# Patient Record
Sex: MALE | Race: BLACK OR AFRICAN AMERICAN | NOT HISPANIC OR LATINO | Employment: UNEMPLOYED | URBAN - METROPOLITAN AREA
[De-identification: names, ages, dates, MRNs, and addresses within clinical notes are randomized per-mention and may not be internally consistent; named-entity substitution may affect disease eponyms.]

---

## 2019-09-07 ENCOUNTER — HOSPITAL ENCOUNTER (EMERGENCY)
Facility: CLINIC | Age: 34
Discharge: HOME | End: 2019-09-07
Attending: EMERGENCY MEDICINE | Admitting: EMERGENCY MEDICINE
Payer: MEDICARE

## 2019-09-07 VITALS
OXYGEN SATURATION: 99 % | HEIGHT: 60 IN | TEMPERATURE: 96.9 F | WEIGHT: 225.8 LBS | DIASTOLIC BLOOD PRESSURE: 71 MMHG | SYSTOLIC BLOOD PRESSURE: 120 MMHG | HEART RATE: 66 BPM | RESPIRATION RATE: 16 BRPM | BODY MASS INDEX: 44.33 KG/M2

## 2019-09-07 DIAGNOSIS — B34.9 VIRAL ILLNESS: ICD-10-CM

## 2019-09-07 LAB
DEPRECATED S PYO AG THROAT QL EIA: NORMAL
SPECIMEN SOURCE: NORMAL

## 2019-09-07 PROCEDURE — 87880 STREP A ASSAY W/OPTIC: CPT | Performed by: FAMILY MEDICINE

## 2019-09-07 PROCEDURE — 87081 CULTURE SCREEN ONLY: CPT | Performed by: FAMILY MEDICINE

## 2019-09-07 PROCEDURE — 99283 EMERGENCY DEPT VISIT LOW MDM: CPT | Performed by: EMERGENCY MEDICINE

## 2019-09-07 PROCEDURE — 99283 EMERGENCY DEPT VISIT LOW MDM: CPT | Mod: Z6 | Performed by: EMERGENCY MEDICINE

## 2019-09-07 RX ORDER — ACETAMINOPHEN 325 MG/1
325-650 TABLET ORAL
COMMUNITY
Start: 2017-02-18

## 2019-09-07 RX ORDER — IBUPROFEN 800 MG/1
800 TABLET, FILM COATED ORAL
COMMUNITY
Start: 2011-08-07

## 2019-09-07 ASSESSMENT — ENCOUNTER SYMPTOMS
RHINORRHEA: 0
SORE THROAT: 1
COUGH: 1
FEVER: 0
MYALGIAS: 1
WEAKNESS: 1
FATIGUE: 1

## 2019-09-07 ASSESSMENT — MIFFLIN-ST. JEOR: SCORE: 896.72

## 2019-09-07 NOTE — ED AVS SNAPSHOT
[facility], [place], Emergency Department  [address]  [place] [address]  Phone:  [phone]  Fax:  [phone]                                    Bassem Thornton   MRN: [id number]    Department:  [facility], Emergency Department   Date of Visit:  9/7/2019           After Visit Summary Signature Page    I have received my discharge instructions, and my questions have been answered. I have discussed any challenges I see with this plan with the nurse or doctor.    ..........................................................................................................................................  Patient/Patient Representative Signature      ..........................................................................................................................................  Patient Representative Print Name and Relationship to Patient    ..................................................               ................................................  Date                                   Time    ..........................................................................................................................................  Reviewed by Signature/Title    ...................................................              ..............................................  Date                                               Time          22EPIC Rev 08/18

## 2019-09-07 NOTE — ED PROVIDER NOTES
"  History     Chief Complaint   Patient presents with     Pharyngitis     onset yesterday morning; mild redness but no white spots in throat; denies dysphagia, just hurts to swallow     Cough     non-productive; onset last evening at 2200; denies fever or chills;      HPI  Bassem Thornton is a 34 year old male who presents with his significant other for evaluation of a sore throat, cough, and body aches. The patient reports onset of body aches, fatigue/weakness, congestion, a nonproductive cough, and a sore throat yesterday after a shift at work (patient is a  at [employer], reports he works 13-14 hour shifts on the weekends). He notes he has been working with another  who was sick \"a few days ago\". Patient denies fevers, rhinorrhea, or hx of diabetes or cardiac/pulmonary disease. He reports he's been taking Tylenol and drinking lots of fluids -- tea, Powerade, Gatorade.    Past Medical History:   Diagnosis Date     history of ADD 3/11/2014       Past Surgical History:   Procedure Laterality Date     NO HISTORY OF SURGERY         Family History   Problem Relation Age of Onset     Arthritis Mother      Other Cancer Mother         lung     Hypertension Father      Lipids Father      Diabetes Father      Diabetes Maternal Grandmother      Arthritis Maternal Grandmother      Eye Disorder Paternal Grandfather         blind      Blood Disease Son         sickle cell trait     C.A.D. No family hx of      Cancer - colorectal No family hx of      Prostate Cancer No family hx of      Allergies No family hx of      Anesthesia Reaction No family hx of        Social History     Tobacco Use     Smoking status: Current Every Day Smoker     Packs/day: 1.00     Years: 3.00     Pack years: 3.00     Types: Cigarettes     Smokeless tobacco: Former User     Quit date: 6/12/2011   Substance Use Topics     Alcohol use: No     Alcohol/week: 0.0 oz       No current facility-administered medications for this encounter.      Current Outpatient " "Medications   Medication     acetaminophen (TYLENOL) 325 MG tablet     ibuprofen (ADVIL/MOTRIN) 800 MG tablet        Allergies   Allergen Reactions     Nkda [No Known Drug Allergies]      I have reviewed the Medications, Allergies, Past Medical and Surgical History, and Social History in the Epic system.    Review of Systems   Constitutional: Positive for fatigue. Negative for fever.   HENT: Positive for congestion and sore throat. Negative for rhinorrhea.    Respiratory: Positive for cough.    Musculoskeletal: Positive for myalgias.   Neurological: Positive for weakness.   All other systems reviewed and are negative.      Physical Exam   BP: 126/77  Pulse: 67  Temp: 98.2  F (36.8  C)  Resp: 16  Height: (!) 6 cm (2.36\")  Weight: 102.4 kg (225 lb 12.8 oz)  SpO2: 97 %      Physical Exam   Constitutional: He is oriented to person, place, and time. He appears well-developed and well-nourished.  Non-toxic appearance. He does not appear ill. No distress.   HENT:   Head: Normocephalic and atraumatic.   Right Ear: Tympanic membrane and ear canal normal.   Mouth/Throat: Mucous membranes are normal. No oral lesions. Uvula swelling (minimal) present. No oropharyngeal exudate, posterior oropharyngeal edema, posterior oropharyngeal erythema or tonsillar abscesses. No tonsillar exudate.   Eyes: EOM are normal.   Neck: Normal range of motion. Neck supple.   Cardiovascular: Regular rhythm, normal heart sounds and intact distal pulses.   Pulmonary/Chest: Effort normal and breath sounds normal.   Abdominal: Soft. There is no tenderness.   Lymphadenopathy:     He has no cervical adenopathy.   Neurological: He is alert and oriented to person, place, and time.   Skin: Skin is warm and dry. No rash noted.   Psychiatric: He has a normal mood and affect. His behavior is normal.   Nursing note and vitals reviewed.      ED Course        Procedures       6:24 PM  The patient was seen and examined by Dr. Green in Room 17.     Results for " orders placed or performed during the hospital encounter of 09/07/19   Rapid strep screen   Result Value Ref Range    Specimen Description Throat     Rapid Strep A Screen       NEGATIVE: No Group A streptococcal antigen detected by immunoassay, await culture report.            Assessments & Plan (with Medical Decision Making)   The patient presents with what appears to be a viral illness.  Symptoms began yesterday.  He was exposed to someone with a viral illness at work.  He has a sore throat but no airway compromise.  Rapid strep is negative today.  No cervical LA on exam.  He has congestion, mild dry cough and body aches.  No evidence of pneumonia on exam or by hx.  I do not feel a cxr is indicated today.  He denies autoimmune diseases.  No cardiac or pulmonary hx.  He was given a few days off of work.  Rest, hydration, otc medicine.  pmd f/u if worsening/concerns.     I have reviewed the nursing notes.    I have reviewed the findings, diagnosis, plan and need for follow up with the patient.    New Prescriptions    No medications on file       Final diagnoses:   Viral illness   I, Silvestre Lora, am serving as a trained medical scribe to document services personally performed by Vero Green MD, based on the provider's statements to me.   IVero MD, was physically present and have reviewed and verified the accuracy of this note documented by Silvestre Lora.    9/7/2019   [facility], [place], EMERGENCY DEPARTMENT     Vero Green MD  09/07/19 [id number]

## 2019-09-07 NOTE — DISCHARGE INSTRUCTIONS
Work note given.     Rest, stay well hydrated.  You can take ibuprofen and tylenol for fever, pain and body aches.     A rapid strep test was checked today and is negative.

## 2019-09-09 LAB
BACTERIA SPEC CULT: ABNORMAL
Lab: ABNORMAL
SPECIMEN SOURCE: ABNORMAL

## 2020-04-11 ENCOUNTER — NURSE TRIAGE (OUTPATIENT)
Dept: NURSING | Facility: CLINIC | Age: 35
End: 2020-04-11

## 2020-04-11 NOTE — TELEPHONE ENCOUNTER
"Pt calling as he has had a \"black\" tooth for years, left side of the neck \"lymph node\" swollen, and a bit of a stuffy nose with his ear feeling congested now and less able to hear. Again, no pain, no fever. I advised home care and if not improving to do a virtual visit with his clinic within 3 days.     Solange Acevedo RN  [facility]  Triage Nurse Advisors      Reason for Disposition    Ear congestion present > 48 hours    Additional Information    Negative: Ear pain is main symptom    Negative: Hearing loss (complete or partial) is main symptom    Negative: Earwax is the main concern    Negative: [1] Has nasal allergies AND [2] they are acting up    Negative: Earache persists > 1 hour    Negative: Pus or cloudy discharge from ear canal    Protocols used: EAR - CONGESTION-A-AH      "

## 2020-04-27 ENCOUNTER — VIRTUAL VISIT (OUTPATIENT)
Dept: FAMILY MEDICINE | Facility: CLINIC | Age: 35
End: 2020-04-27
Payer: MEDICARE

## 2020-04-27 DIAGNOSIS — J01.90 ACUTE SINUSITIS WITH SYMPTOMS > 10 DAYS: Primary | ICD-10-CM

## 2020-04-27 PROCEDURE — 99213 OFFICE O/P EST LOW 20 MIN: CPT | Mod: 95 | Performed by: PHYSICIAN ASSISTANT

## 2020-04-27 RX ORDER — AMOXICILLIN 875 MG
875 TABLET ORAL 2 TIMES DAILY
Qty: 20 TABLET | Refills: 0 | Status: SHIPPED | OUTPATIENT
Start: 2020-04-27 | End: 2020-05-07

## 2020-04-27 NOTE — PATIENT INSTRUCTIONS
Start amoxicillin as directed  Start zyrtec and sudafed as directed  Return to clinic for any new or worsening symptoms or go to ER Urgent care in off hours

## 2020-04-27 NOTE — PROGRESS NOTES
"Bassem Thornton is a 35 year old male who is being evaluated via a billable video visit.      The patient has been notified of following:     \"This video visit will be conducted via a call between you and your physician/provider. We have found that certain health care needs can be provided without the need for an in-person physical exam.  This service lets us provide the care you need with a video conversation.  If a prescription is necessary we can send it directly to your pharmacy.  If lab work is needed we can place an order for that and you can then stop by our lab to have the test done at a later time.    Video visits are billed at different rates depending on your insurance coverage.  Please reach out to your insurance provider with any questions.    If during the course of the call the physician/provider feels a video visit is not appropriate, you will not be charged for this service.\"    Patient has given verbal consent for Video visit? Yes    How would you like to obtain your AVS? Mail a copy    Patient would like the video invitation sent by: Text to cell phone: [phone]    Will anyone else be joining your video visit? No        Subjective     Bassem Thornton is a 35 year old male who presents to clinic today for the following health issues:    HPI   Right ear pain and pressure. When he leans to the left, the pressure releases from the right ear and goes into to the left  Tried ear drops, it helps a little  Symptoms started 2 weeks ago  Still with sinus pressure  No history of chronic sinus infections  Took ibuprofen last night and it helped a little    No fever, chills, nausea, vomiting, cough.             Patient Active Problem List   Diagnosis     Hand pain     Attention deficit hyperactivity disorder (ADHD)     Cellulitis of toe of left foot     Past Surgical History:   Procedure Laterality Date     NO HISTORY OF SURGERY         Social History     Tobacco Use     Smoking status: Current Every Day Smoker     " "Packs/day: 1.00     Years: 3.00     Pack years: 3.00     Types: Cigarettes     Smokeless tobacco: Former User     Quit date: 6/12/2011   Substance Use Topics     Alcohol use: No     Alcohol/week: 0.0 standard drinks     Family History   Problem Relation Age of Onset     Arthritis Mother      Other Cancer Mother         lung     Hypertension Father      Lipids Father      Diabetes Father      Diabetes Maternal Grandmother      Arthritis Maternal Grandmother      Eye Disorder Paternal Grandfather         blind      Blood Disease Son         sickle cell trait     C.A.D. No family hx of      Cancer - colorectal No family hx of      Prostate Cancer No family hx of      Allergies No family hx of      Anesthesia Reaction No family hx of          Current Outpatient Medications   Medication Sig Dispense Refill     amoxicillin (AMOXIL) 875 MG tablet Take 1 tablet (875 mg) by mouth 2 times daily for 10 days 20 tablet 0     acetaminophen (TYLENOL) 325 MG tablet Take 325-650 mg by mouth       ibuprofen (ADVIL/MOTRIN) 800 MG tablet Take 800 mg by mouth         Reviewed and updated as needed this visit by Provider         Review of Systems   ROS COMP: CONSTITUTIONAL: NEGATIVE for fever, chills, change in weight  ENT/MOUTH: NEGATIVE for hoarseness, sore throat and vertigo  RESP: NEGATIVE for significant cough or SOB  CV: NEGATIVE for chest pain, palpitations or peripheral edema  GI: NEGATIVE for nausea, abdominal pain, heartburn, or change in bowel habits  PSYCHIATRIC: NEGATIVE for changes in mood or affect      Objective    There were no vitals taken for this visit.  Estimated body mass index is 28,455.55 kg/m  as calculated from the following:    Height as of 9/7/19: 0.06 m (2.36\").    Weight as of 9/7/19: 102.4 kg (225 lb 12.8 oz).  Physical Exam     GENERAL: healthy, alert and no distress  EYES: Eyes grossly normal to inspection, conjunctivae and sclerae normal  RESP: no audible wheeze, cough, or visible cyanosis.  No visible " retractions or increased work of breathing.  Able to speak fully in complete sentences.  NEURO: Cranial nerves grossly intact, mentation intact and speech normal  PSYCH: mentation appears normal, affect normal/bright, judgement and insight intact, normal speech and appearance well-groomed                ICD-10-CM    1. Acute sinusitis with symptoms > 10 days  J01.90 amoxicillin (AMOXIL) 875 MG tablet           Video-Visit Details    Type of service:  Video Visit    Video total time: 8 min    Originating Location (pt. Location): Home    [place]  [facility] [place]     Mode of Communication:  Video Conference via [facility]    No follow-ups on file.       Joselyn Haro PA-C

## 2021-05-04 ENCOUNTER — OFFICE VISIT (OUTPATIENT)
Dept: FAMILY MEDICINE | Facility: CLINIC | Age: 36
End: 2021-05-04
Payer: MEDICARE

## 2021-05-04 VITALS
DIASTOLIC BLOOD PRESSURE: 80 MMHG | WEIGHT: 220.31 LBS | OXYGEN SATURATION: 97 % | BODY MASS INDEX: 29.84 KG/M2 | TEMPERATURE: 97.9 F | HEART RATE: 84 BPM | HEIGHT: 72 IN | SYSTOLIC BLOOD PRESSURE: 126 MMHG

## 2021-05-04 DIAGNOSIS — Z13.1 SCREENING FOR DIABETES MELLITUS: ICD-10-CM

## 2021-05-04 DIAGNOSIS — R59.9 ENLARGED LYMPH NODES: ICD-10-CM

## 2021-05-04 DIAGNOSIS — Z13.6 CARDIOVASCULAR SCREENING; LDL GOAL LESS THAN 160: ICD-10-CM

## 2021-05-04 DIAGNOSIS — Z00.00 ROUTINE GENERAL MEDICAL EXAMINATION AT A HEALTH CARE FACILITY: Primary | ICD-10-CM

## 2021-05-04 PROCEDURE — 99395 PREV VISIT EST AGE 18-39: CPT | Performed by: PHYSICIAN ASSISTANT

## 2021-05-04 ASSESSMENT — MIFFLIN-ST. JEOR: SCORE: 1967.33

## 2021-05-04 NOTE — PATIENT INSTRUCTIONS
Fasting labs when it works  Return to clinic for any new or worsening symptoms or go to ER Urgent care in off hours    Preventive Health Recommendations  Male Ages 26 - 39    Yearly exam:             See your health care provider every year in order to  o   Review health changes.   o   Discuss preventive care.    o   Review your medicines if your doctor has prescribed any.    You should be tested each year for STDs (sexually transmitted diseases), if you re at risk.     After age 35, talk to your provider about cholesterol testing. If you are at risk for heart disease, have your cholesterol tested at least every 5 years.     If you are at risk for diabetes, you should have a diabetes test (fasting glucose).  Shots: Get a flu shot each year. Get a tetanus shot every 10 years.     Nutrition:    Eat at least 5 servings of fruits and vegetables daily.     Eat whole-grain bread, whole-wheat pasta and brown rice instead of white grains and rice.     Get adequate Calcium and Vitamin D.     Lifestyle    Exercise for at least 150 minutes a week (30 minutes a day, 5 days a week). This will help you control your weight and prevent disease.     Limit alcohol to one drink per day.     No smoking.     Wear sunscreen to prevent skin cancer.     See your dentist every six months for an exam and cleaning.

## 2021-05-04 NOTE — PROGRESS NOTES
3  SUBJECTIVE:   CC: Bassem Thornton is an 36 year old male who presents for preventive health visit.       Patient has been advised of split billing requirements and indicates understanding: Yes       Healthy Habits:    Do you get at least three servings of calcium containing foods daily (dairy, green leafy vegetables, etc.)? yes    Amount of exercise or daily activities, outside of work: 7 day(s) per week    Problems taking medications regularly No    Medication side effects: No    Have you had an eye exam in the past two years? no    Do you see a dentist twice per year? yes    Do you have sleep apnea, excessive snoring or daytime drowsiness?no      Reports a few enlarged lymph nodes under his chin for the last few weeks. No other symptoms.     Today's PHQ-2 Score:   PHQ-2 ( 1999 Pf[employer]) 5/4/2021 3/11/2016   Q1: Little interest or pleasure in doing things 0 0   Q2: Feeling down, depressed or hopeless 0 0   PHQ-2 Score 0 0       Abuse: Current or Past(Physical, Sexual or Emotional)- No  Do you feel safe in your environment? Yes    Have you ever done Advance Care Planning? (For example, a Health Directive, POLST, or a discussion with a medical provider or your loved ones about your wishes): No, advance care planning information given to patient to review.  Patient declined advance care planning discussion at this time.    Social History     Tobacco Use     Smoking status: Current Every Day Smoker     Packs/day: 1.00     Years: 3.00     Pack years: 3.00     Types: Cigarettes     Smokeless tobacco: Former User     Quit date: 6/12/2011   Substance Use Topics     Alcohol use: No     Alcohol/week: 0.0 standard drinks     If you drink alcohol do you typically have >3 drinks per day or >7 drinks per week? No                      Last PSA: No results found for: PSA    Reviewed orders with patient. Reviewed health maintenance and updated orders accordingly - Yes  BP Readings from Last 3 Encounters:   05/04/21 126/80   09/07/19  120/71   03/11/16 130/70    Wt Readings from Last 3 Encounters:   05/04/21 99.9 kg (220 lb 5 oz)   09/07/19 102.4 kg (225 lb 12.8 oz)   03/11/16 100.7 kg (222 lb)                    Reviewed and updated as needed this visit by clinical staff  Tobacco  Allergies  Meds  Problems  Med Hx  Surg Hx  Fam Hx          Reviewed and updated as needed this visit by Provider                    ROS:  CONSTITUTIONAL: NEGATIVE for fever, chills, change in weight  INTEGUMENTARY/SKIN: NEGATIVE for worrisome rashes, moles or lesions  EYES: NEGATIVE for vision changes or irritation  ENT: NEGATIVE for ear, mouth and throat problems  RESP: NEGATIVE for significant cough or SOB  CV: NEGATIVE for chest pain, palpitations or peripheral edema  GI: NEGATIVE for nausea, abdominal pain, heartburn, or change in bowel habits   male: negative for dysuria, hematuria, decreased urinary stream, erectile dysfunction, urethral discharge  MUSCULOSKELETAL: NEGATIVE for significant arthralgias or myalgia  NEURO: NEGATIVE for weakness, dizziness or paresthesias  ENDOCRINE: NEGATIVE for temperature intolerance, skin/hair changes  HEME/ALLERGY/IMMUNE: NEGATIVE for bleeding problems  PSYCHIATRIC: NEGATIVE for changes in mood or affect    OBJECTIVE:   /80   Pulse 84   Temp 97.9  F (36.6  C) (Temporal)   Ht 1.829 m (6')   Wt 99.9 kg (220 lb 5 oz)   SpO2 97%   BMI 29.88 kg/m    EXAM:  GENERAL: healthy, alert and no distress  EYES: Eyes grossly normal to inspection, PERRL and conjunctivae and sclerae normal  HENT: ear canals and TM's normal, nose and mouth without ulcers or lesions  NECK: a few shotty lymph nodes of anterior cervical area, no asymmetry, masses, or scars and thyroid normal to palpation  RESP: lungs clear to auscultation - no rales, rhonchi or wheezes  CV: regular rate and rhythm, normal S1 S2, no S3 or S4, no murmur, click or rub, no peripheral edema and peripheral pulses strong  ABDOMEN: soft, nontender, no  hepatosplenomegaly, no masses and bowel sounds normal  MS: no gross musculoskeletal defects noted, no edema  SKIN: no suspicious lesions or rashes  NEURO: Normal strength and tone, mentation intact and speech normal  PSYCH: mentation appears normal, affect normal/bright    Diagnostic Test Results:  No results found for this or any previous visit (from the past 24 hour(s)).    ASSESSMENT/PLAN:       ICD-10-CM    1. Routine general medical examination at a health care facility  Z00.00    2. Enlarged lymph nodes  R59.9 **CBC with platelets FUTURE anytime   3. Screening for diabetes mellitus  Z13.1 **Glucose FUTURE anytime   4. CARDIOVASCULAR SCREENING; LDL GOAL LESS THAN 160  Z13.6 Lipid panel reflex to direct LDL Fasting       Patient has been advised of split billing requirements and indicates understanding: Yes  COUNSELING:  Reviewed preventive health counseling, as reflected in patient instructions    Estimated body mass index is 29.88 kg/m  as calculated from the following:    Height as of this encounter: 1.829 m (6').    Weight as of this encounter: 99.9 kg (220 lb 5 oz).        He reports that he has been smoking cigarettes. He has a 3.00 pack-year smoking history. He quit smokeless tobacco use about 9 years ago.  Tobacco Cessation Action Plan:         Counseling Resources:  ATP IV Guidelines  Pooled Cohorts Equation Calculator  FRAX Risk Assessment  ICSI Preventive Guidelines  Dietary Guidelines for Americans, 2010  USDA's MyPlate  ASA Prophylaxis  Lung CA Screening    MAURICIO Calvo [facility]

## 2021-07-22 ENCOUNTER — LAB (OUTPATIENT)
Dept: LAB | Facility: CLINIC | Age: 36
End: 2021-07-22
Payer: MEDICARE

## 2021-07-22 DIAGNOSIS — Z13.6 CARDIOVASCULAR SCREENING; LDL GOAL LESS THAN 160: ICD-10-CM

## 2021-07-22 DIAGNOSIS — Z13.1 SCREENING FOR DIABETES MELLITUS: ICD-10-CM

## 2021-07-22 DIAGNOSIS — R59.9 ENLARGED LYMPH NODES: ICD-10-CM

## 2021-07-22 LAB
CHOLEST SERPL-MCNC: 178 MG/DL
ERYTHROCYTE [DISTWIDTH] IN BLOOD BY AUTOMATED COUNT: 13.3 % (ref 10–15)
FASTING STATUS PATIENT QL REPORTED: YES
FASTING STATUS PATIENT QL REPORTED: YES
GLUCOSE BLD-MCNC: 111 MG/DL (ref 70–99)
HCT VFR BLD AUTO: 39.5 % (ref 40–53)
HDLC SERPL-MCNC: 43 MG/DL
HGB BLD-MCNC: 14.1 G/DL (ref 13.3–17.7)
LDLC SERPL CALC-MCNC: 100 MG/DL
MCH RBC QN AUTO: 28.6 PG (ref 26.5–33)
MCHC RBC AUTO-ENTMCNC: 35.7 G/DL (ref 31.5–36.5)
MCV RBC AUTO: 80 FL (ref 78–100)
NONHDLC SERPL-MCNC: 135 MG/DL
PLATELET # BLD AUTO: 195 10E3/UL (ref 150–450)
RBC # BLD AUTO: 4.93 10E6/UL (ref 4.4–5.9)
TRIGL SERPL-MCNC: 174 MG/DL
WBC # BLD AUTO: 6.4 10E3/UL (ref 4–11)

## 2021-07-22 PROCEDURE — 80061 LIPID PANEL: CPT

## 2021-07-22 PROCEDURE — 36415 COLL VENOUS BLD VENIPUNCTURE: CPT

## 2021-07-22 PROCEDURE — 85027 COMPLETE CBC AUTOMATED: CPT

## 2021-07-22 PROCEDURE — 82947 ASSAY GLUCOSE BLOOD QUANT: CPT

## 2021-09-26 ENCOUNTER — HEALTH MAINTENANCE LETTER (OUTPATIENT)
Age: 36
End: 2021-09-26

## 2022-07-03 ENCOUNTER — HEALTH MAINTENANCE LETTER (OUTPATIENT)
Age: 37
End: 2022-07-03

## 2022-09-30 ENCOUNTER — OFFICE VISIT (OUTPATIENT)
Dept: FAMILY MEDICINE | Facility: CLINIC | Age: 37
End: 2022-09-30
Payer: COMMERCIAL

## 2022-09-30 VITALS
TEMPERATURE: 97.1 F | HEART RATE: 93 BPM | DIASTOLIC BLOOD PRESSURE: 74 MMHG | OXYGEN SATURATION: 98 % | SYSTOLIC BLOOD PRESSURE: 119 MMHG

## 2022-09-30 DIAGNOSIS — M10.072 ACUTE IDIOPATHIC GOUT OF LEFT FOOT: Primary | ICD-10-CM

## 2022-09-30 PROCEDURE — 99214 OFFICE O/P EST MOD 30 MIN: CPT | Performed by: FAMILY MEDICINE

## 2022-09-30 RX ORDER — CEPHALEXIN 500 MG/1
500 CAPSULE ORAL 2 TIMES DAILY
Qty: 14 CAPSULE | Refills: 0 | Status: SHIPPED | OUTPATIENT
Start: 2022-09-30 | End: 2022-10-07

## 2022-09-30 RX ORDER — PREDNISONE 20 MG/1
20 TABLET ORAL 2 TIMES DAILY
Qty: 14 TABLET | Refills: 0 | Status: SHIPPED | OUTPATIENT
Start: 2022-09-30 | End: 2022-10-07

## 2022-09-30 ASSESSMENT — PAIN SCALES - GENERAL: PAINLEVEL: WORST PAIN (10)

## 2022-09-30 NOTE — PROGRESS NOTES
Assessment & Plan     Acute idiopathic gout of left foot  Now with red/ weepy patch  Rest/ ice prn  - predniSONE (DELTASONE) 20 MG tablet  Dispense: 14 tablet; Refill: 0  - cephALEXin (KEFLEX) 500 MG capsule  Dispense: 14 capsule; Refill: 0                Nicotine/Tobacco Cessation:  He reports that he has been smoking cigarettes. He has a 3.00 pack-year smoking history. He quit smokeless tobacco use about 11 years ago.  Nicotine/Tobacco Cessation Plan:   Information offered: Patient not interested at this time      See Patient Instructions    No follow-ups on file.    Jeyson Farias MD  [facility] is a 37 year old, presenting for the following health issues:  Arthritis      Arthritis    History of Present Illness       Reason for visit:  Pain in foot  Symptom onset:  3-7 days ago    He eats 2-3 servings of fruits and vegetables daily.He consumes 5 sweetened beverage(s) daily.He exercises with enough effort to increase his heart rate 30 to 60 minutes per day.  He exercises with enough effort to increase his heart rate 5 days per week.   He is taking medications regularly.     Has been having issues sleeping with the pain in his foot. When he wakes up and puts his foot on the floor it is shooting pain and he can't walk on it for a little bit. Took boot off and feels throbbing in the smallest three toes and all over the top of his foot.      no history of gout   no trauma      Review of Systems   Musculoskeletal: Positive for arthritis.      Constitutional, HEENT, cardiovascular, pulmonary, gi and gu systems are negative, except as otherwise noted.      Objective    /74 (BP Location: Left arm, Patient Position: Sitting, Cuff Size: Adult Regular)   Pulse 93   Temp 97.1  F (36.2  C) (Temporal)   SpO2 98%   There is no height or weight on file to calculate BMI.  Physical Exam   GENERAL: alert and mild distress  MS: tenderness to palpation left midfoot red/  mildly swollen, exquistly tender  SKIN: erythema - patch distal midfoot  NEURO: Normal strength and tone, mentation intact and speech normal  LYMPH: no cervical, supraclavicular, axillary, or inguinal adenopathy

## 2023-04-23 ENCOUNTER — HEALTH MAINTENANCE LETTER (OUTPATIENT)
Age: 38
End: 2023-04-23

## 2024-06-30 ENCOUNTER — HEALTH MAINTENANCE LETTER (OUTPATIENT)
Age: 39
End: 2024-06-30

## 2024-07-24 ENCOUNTER — OFFICE VISIT (OUTPATIENT)
Dept: FAMILY MEDICINE | Facility: CLINIC | Age: 39
End: 2024-07-24
Payer: COMMERCIAL

## 2024-07-24 VITALS
DIASTOLIC BLOOD PRESSURE: 94 MMHG | OXYGEN SATURATION: 100 % | HEART RATE: 92 BPM | SYSTOLIC BLOOD PRESSURE: 132 MMHG | RESPIRATION RATE: 14 BRPM | TEMPERATURE: 97.7 F | HEIGHT: 71 IN | BODY MASS INDEX: 31.57 KG/M2 | WEIGHT: 225.5 LBS

## 2024-07-24 DIAGNOSIS — E78.5 HYPERLIPIDEMIA LDL GOAL <100: ICD-10-CM

## 2024-07-24 DIAGNOSIS — Z11.59 NEED FOR HEPATITIS C SCREENING TEST: ICD-10-CM

## 2024-07-24 DIAGNOSIS — Z00.00 ENCOUNTER FOR ANNUAL PHYSICAL EXAM: Primary | ICD-10-CM

## 2024-07-24 DIAGNOSIS — R03.0 ELEVATED BLOOD PRESSURE READING WITHOUT DIAGNOSIS OF HYPERTENSION: ICD-10-CM

## 2024-07-24 DIAGNOSIS — Z11.3 SCREEN FOR STD (SEXUALLY TRANSMITTED DISEASE): ICD-10-CM

## 2024-07-24 DIAGNOSIS — S86.812A PATELLAR TENDON STRAIN, LEFT, INITIAL ENCOUNTER: ICD-10-CM

## 2024-07-24 DIAGNOSIS — R73.9 ELEVATED BLOOD SUGAR: ICD-10-CM

## 2024-07-24 LAB
CHOLEST SERPL-MCNC: 176 MG/DL
FASTING STATUS PATIENT QL REPORTED: ABNORMAL
HBA1C MFR BLD: 5.9 % (ref 0–5.6)
HBV SURFACE AB SERPL IA-ACNC: <3.5 M[IU]/ML
HBV SURFACE AB SERPL IA-ACNC: NONREACTIVE M[IU]/ML
HBV SURFACE AG SERPL QL IA: NONREACTIVE
HCV AB SERPL QL IA: NONREACTIVE
HDLC SERPL-MCNC: 40 MG/DL
HIV 1+2 AB+HIV1 P24 AG SERPL QL IA: NONREACTIVE
LDLC SERPL CALC-MCNC: 117 MG/DL
NONHDLC SERPL-MCNC: 136 MG/DL
TRIGL SERPL-MCNC: 95 MG/DL

## 2024-07-24 PROCEDURE — 86803 HEPATITIS C AB TEST: CPT

## 2024-07-24 PROCEDURE — 87340 HEPATITIS B SURFACE AG IA: CPT

## 2024-07-24 PROCEDURE — 36415 COLL VENOUS BLD VENIPUNCTURE: CPT

## 2024-07-24 PROCEDURE — 99213 OFFICE O/P EST LOW 20 MIN: CPT | Mod: 25

## 2024-07-24 PROCEDURE — 90715 TDAP VACCINE 7 YRS/> IM: CPT

## 2024-07-24 PROCEDURE — 90471 IMMUNIZATION ADMIN: CPT

## 2024-07-24 PROCEDURE — 80061 LIPID PANEL: CPT

## 2024-07-24 PROCEDURE — 86706 HEP B SURFACE ANTIBODY: CPT

## 2024-07-24 PROCEDURE — 87591 N.GONORRHOEAE DNA AMP PROB: CPT

## 2024-07-24 PROCEDURE — 99395 PREV VISIT EST AGE 18-39: CPT | Mod: 25

## 2024-07-24 PROCEDURE — 87491 CHLMYD TRACH DNA AMP PROBE: CPT

## 2024-07-24 PROCEDURE — 87389 HIV-1 AG W/HIV-1&-2 AB AG IA: CPT

## 2024-07-24 PROCEDURE — 86592 SYPHILIS TEST NON-TREP QUAL: CPT

## 2024-07-24 PROCEDURE — 83036 HEMOGLOBIN GLYCOSYLATED A1C: CPT

## 2024-07-24 PROCEDURE — 86593 SYPHILIS TEST NON-TREP QUANT: CPT

## 2024-07-24 ASSESSMENT — PAIN SCALES - GENERAL: PAINLEVEL: NO PAIN (0)

## 2024-07-24 NOTE — PROGRESS NOTES
Preventive Care Visit  [facility]  Robbie Boston NP, Nurse Practitioner Primary Care  Jul 24, 2024      Assessment & Plan     Need for hepatitis C screening test  - Hepatitis C Screen Reflex to HCV RNA Quant and Genotype; Future  - Hepatitis C Screen Reflex to HCV RNA Quant and Genotype    Screen for STD (sexually transmitted disease)  - NEISSERIA GONORRHOEA PCR; Future  - CHLAMYDIA TRACHOMATIS PCR; Future  - HIV Antigen Antibody Combo; Future  - Rapid Plasma Reagin w Rflx to TITER; Future  - Hepatitis B Surface Antibody; Future  - Hepatitis B surface antigen; Future  - NEISSERIA GONORRHOEA PCR  - CHLAMYDIA TRACHOMATIS PCR  - HIV Antigen Antibody Combo  - Rapid Plasma Reagin w Rflx to TITER  - Hepatitis B Surface Antibody  - Hepatitis B surface antigen  History of positive syphilis test.    Patellar tendon strain, left, initial encounter  - Physical Therapy  Referral; Future  - Ankle/Knee Bracing Supplies Order Knee Sleeve/Brace; Left; Closed  Patient has some bilateral left knee tenderness to palpation of the tibia.  This appears to be below the joint line.  Symptoms could relate to a patellar strain with differential for viscus injury.  I have a low suspicion for meniscus injury as patient only had temporary instability of his knee which then resolved.  His Dacia's maneuver was also negative.  No ligament laxity noted.  Prescription given for knee brace and advised to follow-up with PT given his frequent exercise.    Elevated blood sugar  - Hemoglobin A1c; Future  - Hemoglobin A1c    Encounter for annual physical exam  - Adult Eye  Referral; Future    Hyperlipidemia LDL goal <100  - Lipid panel reflex to direct LDL Fasting; Future  - Lipid panel reflex to direct LDL Fasting    Elevated blood pressure reading without diagnosis of hypertension  Advised patient to start monitoring his blood pressure on a regular basis at home.  Advised him to watch for any  persistent readings in the 140s/90s, which point he should return to clinic.  He seems to be at the baseline level of 130s/80s to 90s.  We discussed techniques of working on salt reduction in the diet.    Patient has been advised of split billing requirements and indicates understanding: Yes        Subjective   Bassem is a 39 year old, presenting for the following:  Physical  Girlfriend had tested positive for herpes through blood and had some skin lesions. Had sex 4 days ago  Wondering about STD testing.    Bumps on underarm.    Left knee: walking dog and ran away and twisted. And twisted to the lateral side. Bilateral knee pain.  Laying down and adjusting at night makes it worse. Gets up to 7.5/10.  It was swollen. First had a sensation of knee falling out, but this had improved.    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  GF. Dad of 8 kids - was with partner for 28 years.  18 to 4 years old.  Diet: A lot of chicken/fish, a little bit of beef. Does not like pork. Corn/green beans, oranges, apples, strawberry.  Exercise: Normally does 40 jumping jacks in morning, running half a mile. Working on weight lifting.- Always active.      7/24/2024    11:00 AM   Additional Questions   Roomed by Lily LARKIN          HPI      7/24/2024   General Health   How would you rate your overall physical health? Excellent   Feel stress (tense, anxious, or unable to sleep) To some extent      (!) STRESS CONCERN- Working on bills, trying to find job.      7/24/2024   Nutrition   Diet: Regular (no restrictions)    I don't know       Multiple values from one day are sorted in reverse-chronological order          No data to display                  7/24/2024   Social Factors   Worry food won't last until get money to buy more No   Food not last or not have enough money for food? No   Do you have housing? (Housing is defined as stable permanent housing and does not include staying ouside in a car, in a tent, in  "an abandoned building, in an overnight shelter, or couch-surfing.) Yes   Are you worried about losing your housing? No   Lack of transportation? No   Unable to get utilities (heat,electricity)? No            7/24/2024   Fall Risk   Fallen 2 or more times in the past year? No   Trouble with walking or balance? Yes   Reason Gait Speed Test Not Completed Patient declines             7/24/2024   Dental   Dentist two times every year? Yes   Eye        7/24/2024   TB Screening   Were you born outside of the [place]? No            Today's PHQ-2 Score:       7/24/2024    10:45 AM   PHQ-2 ( 1999 [employer])   Q1: Little interest or pleasure in doing things 0   Q2: Feeling down, depressed or hopeless 0   PHQ-2 Score 0   Q1: Little interest or pleasure in doing things Not at all   Q2: Feeling down, depressed or hopeless Not at all   PHQ-2 Score 0           7/24/2024   Substance Use   Alcohol more than 3/day or more than 7/wk No   Do you use any other substances recreationally? No        Social History     Tobacco Use    Smoking status: Former     Current packs/day: 1.00     Average packs/day: 1 pack/day for 3.0 years (3.0 ttl pk-yrs)     Types: Cigarettes    Smokeless tobacco: Former     Quit date: 6/12/2011   Vaping Use    Vaping status: Former   Substance Use Topics    Alcohol use: No     Alcohol/week: 0.0 standard drinks of alcohol    Drug use: Yes     Types: Marijuana     Comment: Stress-free (few times per week).             7/24/2024   Contraception/Family Planning   Questions about contraception or family planning No           Reviewed and updated as needed this visit by Provider         Hector Rebollar                 Objective    Exam  BP (!) 132/94   Pulse 92   Temp 97.7  F (36.5  C) (Temporal)   Resp 14   Ht 1.803 m (5' 11\")   Wt 102.3 kg (225 lb 8 oz)   SpO2 100%   BMI 31.45 kg/m     Estimated body mass index is 31.45 kg/m  as calculated from the following:    Height as of this encounter: 1.803 m (5' 11\").    Weight as of " this encounter: 102.3 kg (225 lb 8 oz).    Physical Exam  GENERAL: alert and no distress  EYES: Eyes grossly normal to inspection, PERRL and conjunctivae and sclerae normal  HENT: ear canals and TM's normal, nose and mouth without ulcers or lesions  NECK: no adenopathy, no asymmetry, masses, or scars  RESP: lungs clear to auscultation - no rales, rhonchi or wheezes  CV: regular rate and rhythm, normal S1 S2, no S3 or S4, no murmur, click or rub, no peripheral edema  ABDOMEN: soft, nontender, no hepatosplenomegaly, no masses and bowel sounds normal  MS: no gross musculoskeletal defects noted, no edema  SKIN: no suspicious lesions or rashes  NEURO: Normal strength and tone, mentation intact and speech normal  PSYCH: mentation appears normal, affect normal/bright        Signed Electronically by: Robbie Boston NP

## 2024-07-25 ENCOUNTER — TELEPHONE (OUTPATIENT)
Dept: FAMILY MEDICINE | Facility: CLINIC | Age: 39
End: 2024-07-25

## 2024-07-25 DIAGNOSIS — A53.9 SYPHILIS: Primary | ICD-10-CM

## 2024-07-25 LAB
C TRACH DNA SPEC QL NAA+PROBE: NEGATIVE
N GONORRHOEA DNA SPEC QL NAA+PROBE: NEGATIVE
RPR SER QL: REACTIVE
RPR SER-TITR: NORMAL {TITER}

## 2024-07-25 RX ORDER — DOXYCYCLINE HYCLATE 100 MG
100 TABLET ORAL 2 TIMES DAILY
Qty: 56 TABLET | Refills: 0 | Status: SHIPPED | OUTPATIENT
Start: 2024-07-25 | End: 2024-08-22

## 2024-07-25 NOTE — TELEPHONE ENCOUNTER
Called patient regarding labs. Recommend treating for latent syphilis given presence of antibody titer and retest at 6 months. Recommended weight loss for prediabetes

## 2025-02-19 ENCOUNTER — OFFICE VISIT (OUTPATIENT)
Dept: FAMILY MEDICINE | Facility: CLINIC | Age: 40
End: 2025-02-19
Payer: MEDICAID

## 2025-02-19 VITALS
HEART RATE: 86 BPM | OXYGEN SATURATION: 98 % | BODY MASS INDEX: 31.36 KG/M2 | TEMPERATURE: 98.9 F | WEIGHT: 231.5 LBS | SYSTOLIC BLOOD PRESSURE: 113 MMHG | HEIGHT: 72 IN | DIASTOLIC BLOOD PRESSURE: 82 MMHG

## 2025-02-19 DIAGNOSIS — M79.10 MYALGIA: ICD-10-CM

## 2025-02-19 DIAGNOSIS — Z13.220 LIPID SCREENING: ICD-10-CM

## 2025-02-19 DIAGNOSIS — Z00.00 ROUTINE GENERAL MEDICAL EXAMINATION AT A HEALTH CARE FACILITY: Primary | ICD-10-CM

## 2025-02-19 DIAGNOSIS — Z11.3 ROUTINE SCREENING FOR STI (SEXUALLY TRANSMITTED INFECTION): ICD-10-CM

## 2025-02-19 DIAGNOSIS — Z13.1 SCREENING FOR DIABETES MELLITUS: ICD-10-CM

## 2025-02-19 LAB
CHOLEST SERPL-MCNC: 184 MG/DL
CK SERPL-CCNC: 162 U/L (ref 39–308)
EST. AVERAGE GLUCOSE BLD GHB EST-MCNC: 134 MG/DL
FASTING STATUS PATIENT QL REPORTED: YES
HBA1C MFR BLD: 6.3 % (ref 0–5.6)
HDLC SERPL-MCNC: 47 MG/DL
HIV 1+2 AB+HIV1 P24 AG SERPL QL IA: NONREACTIVE
LDLC SERPL CALC-MCNC: 120 MG/DL
NONHDLC SERPL-MCNC: 137 MG/DL
TRIGL SERPL-MCNC: 85 MG/DL
VIT D+METAB SERPL-MCNC: 11 NG/ML (ref 20–50)

## 2025-02-19 PROCEDURE — 82306 VITAMIN D 25 HYDROXY: CPT | Performed by: FAMILY MEDICINE

## 2025-02-19 PROCEDURE — 3074F SYST BP LT 130 MM HG: CPT | Performed by: FAMILY MEDICINE

## 2025-02-19 PROCEDURE — 99213 OFFICE O/P EST LOW 20 MIN: CPT | Mod: 25 | Performed by: FAMILY MEDICINE

## 2025-02-19 PROCEDURE — 3079F DIAST BP 80-89 MM HG: CPT | Performed by: FAMILY MEDICINE

## 2025-02-19 PROCEDURE — 87491 CHLMYD TRACH DNA AMP PROBE: CPT | Performed by: FAMILY MEDICINE

## 2025-02-19 PROCEDURE — 99000 SPECIMEN HANDLING OFFICE-LAB: CPT | Performed by: FAMILY MEDICINE

## 2025-02-19 PROCEDURE — 83036 HEMOGLOBIN GLYCOSYLATED A1C: CPT | Performed by: FAMILY MEDICINE

## 2025-02-19 PROCEDURE — 99396 PREV VISIT EST AGE 40-64: CPT | Performed by: FAMILY MEDICINE

## 2025-02-19 PROCEDURE — 80061 LIPID PANEL: CPT | Performed by: FAMILY MEDICINE

## 2025-02-19 PROCEDURE — 82550 ASSAY OF CK (CPK): CPT | Performed by: FAMILY MEDICINE

## 2025-02-19 PROCEDURE — 86780 TREPONEMA PALLIDUM: CPT | Performed by: FAMILY MEDICINE

## 2025-02-19 PROCEDURE — 87591 N.GONORRHOEAE DNA AMP PROB: CPT | Performed by: FAMILY MEDICINE

## 2025-02-19 PROCEDURE — 86780 TREPONEMA PALLIDUM: CPT | Mod: 90 | Performed by: FAMILY MEDICINE

## 2025-02-19 PROCEDURE — 36415 COLL VENOUS BLD VENIPUNCTURE: CPT | Performed by: FAMILY MEDICINE

## 2025-02-19 PROCEDURE — 87389 HIV-1 AG W/HIV-1&-2 AB AG IA: CPT | Performed by: FAMILY MEDICINE

## 2025-02-19 PROCEDURE — 1125F AMNT PAIN NOTED PAIN PRSNT: CPT | Performed by: FAMILY MEDICINE

## 2025-02-19 PROCEDURE — 86592 SYPHILIS TEST NON-TREP QUAL: CPT | Performed by: FAMILY MEDICINE

## 2025-02-19 SDOH — HEALTH STABILITY: PHYSICAL HEALTH: ON AVERAGE, HOW MANY DAYS PER WEEK DO YOU ENGAGE IN MODERATE TO STRENUOUS EXERCISE (LIKE A BRISK WALK)?: 2 DAYS

## 2025-02-19 ASSESSMENT — SOCIAL DETERMINANTS OF HEALTH (SDOH): HOW OFTEN DO YOU GET TOGETHER WITH FRIENDS OR RELATIVES?: NEVER

## 2025-02-19 ASSESSMENT — PAIN SCALES - GENERAL: PAINLEVEL_OUTOF10: SEVERE PAIN (9)

## 2025-02-19 NOTE — PROGRESS NOTES
Preventive Care Visit  [facility]  Kolton Floyd DO, Family Medicine  Feb 19, 2025    Assessment & Plan     {Diag Picklist:993566}      BMI  Estimated body mass index is 31.4 kg/m  as calculated from the following:    Height as of this encounter: 1.829 m (6').    Weight as of this encounter: 105 kg (231 lb 8 oz).   Weight management plan: Discussed healthy diet and exercise guidelines    Counseling  Appropriate preventive services were addressed with this patient via screening, questionnaire, or discussion as appropriate for fall prevention, nutrition, physical activity, Tobacco-use cessation, social engagement, weight loss and cognition.  Checklist reviewing preventive services available has been given to the patient.  Reviewed patient's diet, addressing concerns and/or questions.   He is at risk for lack of exercise and has been provided with information to increase physical activity for the benefit of his well-being.   Patient is at risk for social isolation and has been provided with information about the benefit of social connection.   Discussed possible causes of fatigue.       Subjective   Mr Thornton is a 40 year old, presenting for the following:  Physical        2/19/2025     9:32 AM   Additional Questions   Roomed by Michelle COX    Chronic pain   Wondering about getting a medical card             2/19/2025   General Health   How would you rate your overall physical health? Excellent   Feel stress (tense, anxious, or unable to sleep) Only a little   (!) STRESS CONCERN      2/19/2025   Nutrition   Diet: Regular (no restrictions)         2/19/2025   Exercise   Days per week of moderate/strenous exercise 2 days   (!) EXERCISE CONCERN      2/19/2025   Social Factors   Frequency of gathering with friends or relatives Never   Worry food won't last until get money to buy more Yes   Food not last or not have enough money for food? No   Do you have housing? (Housing is  defined as stable permanent housing and does not include staying ouside in a car, in a tent, in an abandoned building, in an overnight shelter, or couch-surfing.) Yes   Are you worried about losing your housing? No   Lack of transportation? Yes   Unable to get utilities (heat,electricity)? No   (!) FOOD SECURITY CONCERN PRESENT (!) TRANSPORTATION CONCERN PRESENT(!) SOCIAL CONNECTIONS CONCERN      2/19/2025   Fall Risk   Fallen 2 or more times in the past year? No   Trouble with walking or balance? No          2/19/2025   Dental   Dentist two times every year? Yes         7/24/2024   TB Screening   Were you born outside of the [place]? No         Today's PHQ-2 Score:       2/19/2025     9:19 AM   PHQ-2 ( 1999 Pfizer)   Q1: Little interest or pleasure in doing things 0   Q2: Feeling down, depressed or hopeless 0   PHQ-2 Score 0    Q1: Little interest or pleasure in doing things Not at all   Q2: Feeling down, depressed or hopeless Not at all   PHQ-2 Score 0       Patient-reported           2/19/2025   Substance Use   Alcohol more than 3/day or more than 7/wk No   Do you use any other substances recreationally? (!) CANNABIS PRODUCTS     Social History     Tobacco Use    Smoking status: Former     Current packs/day: 1.00     Average packs/day: 1 pack/day for 3.0 years (3.0 ttl pk-yrs)     Types: Cigarettes    Smokeless tobacco: Former     Quit date: 6/12/2011   Vaping Use    Vaping status: Former   Substance Use Topics    Alcohol use: No     Alcohol/week: 0.0 standard drinks of alcohol    Drug use: Yes     Types: Marijuana     Comment: Stress-free (few times per week).     ASCVD Risk   The 10-year ASCVD risk score (Sissy BRANNON, et al., 2019) is: 2.6%    Values used to calculate the score:      Age: 40 years      Sex: Male      Is Non- : Yes      Diabetic: No      Tobacco smoker: No      Systolic Blood Pressure: 113 mmHg      Is BP treated: No      HDL Cholesterol: 40 mg/dL      Total  Cholesterol: 176 mg/dL        2/19/2025   Contraception/Family Planning   Questions about contraception or family planning No     Reviewed and updated as needed this visit by Provider                         Objective    Exam  /82 (BP Location: Left arm, Patient Position: Sitting, Cuff Size: Adult Large)   Pulse 86   Temp 98.9  F (37.2  C) (Temporal)   Ht 1.829 m (6')   Wt 105 kg (231 lb 8 oz)   SpO2 98%   BMI 31.40 kg/m     Estimated body mass index is 31.4 kg/m  as calculated from the following:    Height as of this encounter: 1.829 m (6').    Weight as of this encounter: 105 kg (231 lb 8 oz).    Physical Exam  Constitutional:       General: He is not in acute distress.     Appearance: Normal appearance.   HENT:      Head: Normocephalic.      Right Ear: Tympanic membrane, ear canal and external ear normal.      Left Ear: Tympanic membrane, ear canal and external ear normal.      Mouth/Throat:      Mouth: Mucous membranes are moist.      Pharynx: No oropharyngeal exudate or posterior oropharyngeal erythema.   Eyes:      General: No scleral icterus.  Cardiovascular:      Rate and Rhythm: Normal rate and regular rhythm.      Heart sounds: No murmur heard.  Pulmonary:      Effort: Pulmonary effort is normal. No respiratory distress.      Breath sounds: Normal breath sounds.   Abdominal:      General: Bowel sounds are normal.   Lymphadenopathy:      Cervical: No cervical adenopathy.   Neurological:      General: No focal deficit present.      Mental Status: He is alert.   Psychiatric:         Mood and Affect: Mood normal.         Behavior: Behavior normal.             Signed Electronically by: Kolton Floyd DO  {Email feedback regarding this note to [email or web address]   :[id number]}   rhythm.      Heart sounds: No murmur heard.  Pulmonary:      Effort: Pulmonary effort is normal. No respiratory distress.      Breath sounds: Normal breath sounds.   Abdominal:      General: Bowel sounds are normal.   Lymphadenopathy:      Cervical: No cervical adenopathy.   Neurological:      General: No focal deficit present.      Mental Status: He is alert.   Psychiatric:         Mood and Affect: Mood normal.         Behavior: Behavior normal.             Signed Electronically by: Kolton Floyd DO

## 2025-02-20 LAB
C TRACH DNA SPEC QL PROBE+SIG AMP: NEGATIVE
N GONORRHOEA DNA SPEC QL NAA+PROBE: NEGATIVE
RPR SER QL: NONREACTIVE
SPECIMEN TYPE: NORMAL
T PALLIDUM AB SER QL: REACTIVE

## 2025-02-23 LAB — T PALLIDUM AB SER QL AGGL: REACTIVE

## 2025-03-12 ENCOUNTER — OFFICE VISIT (OUTPATIENT)
Dept: FAMILY MEDICINE | Facility: CLINIC | Age: 40
End: 2025-03-12
Payer: COMMERCIAL

## 2025-03-12 VITALS
SYSTOLIC BLOOD PRESSURE: 127 MMHG | BODY MASS INDEX: 32.21 KG/M2 | WEIGHT: 237.5 LBS | HEART RATE: 89 BPM | OXYGEN SATURATION: 96 % | DIASTOLIC BLOOD PRESSURE: 81 MMHG | TEMPERATURE: 98.8 F

## 2025-03-12 DIAGNOSIS — F90.9 ATTENTION DEFICIT HYPERACTIVITY DISORDER (ADHD), UNSPECIFIED ADHD TYPE: Primary | ICD-10-CM

## 2025-03-12 DIAGNOSIS — G44.209 TENSION HEADACHE: ICD-10-CM

## 2025-03-12 DIAGNOSIS — F41.9 ANXIETY: ICD-10-CM

## 2025-03-12 DIAGNOSIS — M25.562 LEFT LATERAL KNEE PAIN: ICD-10-CM

## 2025-03-12 PROCEDURE — 3074F SYST BP LT 130 MM HG: CPT | Performed by: FAMILY MEDICINE

## 2025-03-12 PROCEDURE — 99214 OFFICE O/P EST MOD 30 MIN: CPT | Performed by: FAMILY MEDICINE

## 2025-03-12 PROCEDURE — 1125F AMNT PAIN NOTED PAIN PRSNT: CPT | Performed by: FAMILY MEDICINE

## 2025-03-12 PROCEDURE — 3079F DIAST BP 80-89 MM HG: CPT | Performed by: FAMILY MEDICINE

## 2025-03-12 ASSESSMENT — PAIN SCALES - GENERAL: PAINLEVEL_OUTOF10: SEVERE PAIN (10)

## 2025-03-12 NOTE — PROGRESS NOTES
Assessment & Plan     Attention deficit hyperactivity disorder (ADHD), unspecified ADHD type:  - ADHD symptoms managed with medical cannabis, which helps with focus and frustration.  - Authorization for medical cannabis certification submitted.    Tension headache:  - Headaches managed with medical cannabis.  - Authorization for medical cannabis certification submitted.    Anxiety:  - Anxiety symptoms managed with medical cannabis.  - Authorization for medical cannabis certification submitted.    Left lateral knee pain:  - Possible LCL tear with joint laxity on varus stress. MRI recommended for further evaluation.  - MRI ordered to assess potential LCL tear. Knee brace with bilateral stabilizers ordered through [facility] to prevent lateral motion while walking.    Consent was obtained from the patient to use an AI documentation tool in the creation of this note.    1. Attention deficit hyperactivity disorder (ADHD), unspecified ADHD type (Primary)  2. Tension headache  3. Anxiety  4. Left lateral knee pain  - MR Knee Left w/o Contrast; Future  - Ankle/Knee Bracing Supplies Order Hinged Knee Brace; Left      Subjective   Mr Thornton is a 40 year old, presenting for the following health issues:  Medication Request (Discuss medical cannabis), Lipids (/), and Diabetes        3/12/2025    10:29 AM   Additional Questions   Roomed by Abbie FLOR     History of Present Illness       Diabetes:   He presents for follow up of diabetes.    He is not checking blood glucose.         He has no concerns regarding his diabetes at this time.   He is not experiencing numbness or burning in feet, excessive thirst, blurry vision, weight changes or redness, sores or blisters on feet.           He eats 0-1 servings of fruits and vegetables daily.He consumes 2 sweetened beverage(s) daily.He exercises with enough effort to increase his heart rate 10 to 19 minutes per day.  He exercises with enough effort to increase his heart rate 5  days per week.   He is taking medications regularly.   Norberto Thornton, age: 40 years    Knee Pain  - Severe pain in the knee after being yanked by a dog, causing a twist in the leg  - Pain located on the side of the knee  - Occurred approximately 1-2 weeks prior to the visit  - Knee feels like it is giving out, has happened a couple of times  - Pain exacerbated when trying to get up or when the leg is lifted  - Taking ibuprofen for pain relief, though it is not effective    Cannabis Use  - Uses medical cannabis to manage pain, nausea, headaches, depression, anxiety, frustration, PTSD, and ADHD  - Reports cannabis helps with focus and reduces frustration related to ADHD    Headaches  - Experiences headaches, which are alleviated by cannabis use          Objective    /81 (BP Location: Right arm, Patient Position: Sitting, Cuff Size: Adult Large)   Pulse 89   Temp 98.8  F (37.1  C) (Temporal)   Wt 107.7 kg (237 lb 8 oz)   SpO2 96%   BMI 32.21 kg/m    Body mass index is 32.21 kg/m .  Physical Exam  Constitutional:       General: He is not in acute distress.  Eyes:      General: No scleral icterus.  Pulmonary:      Effort: No respiratory distress.   Neurological:      Mental Status: He is alert.   Psychiatric:         Mood and Affect: Mood normal.         Behavior: Behavior normal.                  Signed Electronically by: Kolton Floyd DO

## 2025-03-24 NOTE — PATIENT INSTRUCTIONS
Patient Education   Preventive Care Advice   This is general advice given by our system to help you stay healthy. However, your care team may have specific advice just for you. Please talk to your care team about your preventive care needs.  Nutrition  Eat 5 or more servings of fruits and vegetables each day.  Try wheat bread, brown rice and whole grain pasta (instead of white bread, rice, and pasta).  Get enough calcium and vitamin D. Check the label on foods and aim for 100% of the RDA (recommended daily allowance).  Lifestyle  Exercise at least 150 minutes each week  (30 minutes a day, 5 days a week).  Do muscle strengthening activities 2 days a week. These help control your weight and prevent disease.  No smoking.  Wear sunscreen to prevent skin cancer.  Have a dental exam and cleaning every 6 months.  Yearly exams  See your health care team every year to talk about:  Any changes in your health.  Any medicines your care team has prescribed.  Preventive care, family planning, and ways to prevent chronic diseases.  Shots (vaccines)   HPV shots (up to age 26), if you've never had them before.  Hepatitis B shots (up to age 59), if you've never had them before.  COVID-19 shot: Get this shot when it's due.  Flu shot: Get a flu shot every year.  Tetanus shot: Get a tetanus shot every 10 years.  Pneumococcal, hepatitis A, and RSV shots: Ask your care team if you need these based on your risk.  Shingles shot (for age 50 and up)  General health tests  Diabetes screening:  Starting at age 35, Get screened for diabetes at least every 3 years.  If you are younger than age 35, ask your care team if you should be screened for diabetes.  Cholesterol test: At age 39, start having a cholesterol test every 5 years, or more often if advised.  Bone density scan (DEXA): At age 50, ask your care team if you should have this scan for osteoporosis (brittle bones).  Hepatitis C: Get tested at least once in your life.  STIs (sexually  transmitted infections)  Before age 24: Ask your care team if you should be screened for STIs.  After age 24: Get screened for STIs if you're at risk. You are at risk for STIs (including HIV) if:  You are sexually active with more than one person.  You don't use condoms every time.  You or a partner was diagnosed with a sexually transmitted infection.  If you are at risk for HIV, ask about PrEP medicine to prevent HIV.  Get tested for HIV at least once in your life, whether you are at risk for HIV or not.  Cancer screening tests  Cervical cancer screening: If you have a cervix, begin getting regular cervical cancer screening tests starting at age 21.  Breast cancer scan (mammogram): If you've ever had breasts, begin having regular mammograms starting at age 40. This is a scan to check for breast cancer.  Colon cancer screening: It is important to start screening for colon cancer at age 45.  Have a colonoscopy test every 10 years (or more often if you're at risk) Or, ask your provider about stool tests like a FIT test every year or Cologuard test every 3 years.  To learn more about your testing options, visit:   .  For help making a decision, visit:   https://bit.ly/se00330.  Prostate cancer screening test: If you have a prostate, ask your care team if a prostate cancer screening test (PSA) at age 55 is right for you.  Lung cancer screening: If you are a current or former smoker ages 50 to 80, ask your care team if ongoing lung cancer screenings are right for you.  For informational purposes only. Not to replace the advice of your health care provider. Copyright   2023 [employer] [facility] [employer]. All rights reserved. Clinically reviewed by the [facility] Transitions Program. [employer] [id number] - REV 01/24.